# Patient Record
Sex: FEMALE | Race: WHITE
[De-identification: names, ages, dates, MRNs, and addresses within clinical notes are randomized per-mention and may not be internally consistent; named-entity substitution may affect disease eponyms.]

---

## 2019-09-13 ENCOUNTER — HOSPITAL ENCOUNTER (INPATIENT)
Dept: HOSPITAL 95 - ER | Age: 74
LOS: 3 days | Discharge: HOME HEALTH | DRG: 689 | End: 2019-09-16
Attending: HOSPITALIST | Admitting: HOSPITALIST
Payer: MEDICARE

## 2019-09-13 VITALS — HEIGHT: 67.99 IN | WEIGHT: 259.7 LBS | BODY MASS INDEX: 39.36 KG/M2

## 2019-09-13 DIAGNOSIS — F17.210: ICD-10-CM

## 2019-09-13 DIAGNOSIS — N39.0: Primary | ICD-10-CM

## 2019-09-13 DIAGNOSIS — I10: ICD-10-CM

## 2019-09-13 DIAGNOSIS — Z79.4: ICD-10-CM

## 2019-09-13 DIAGNOSIS — E78.5: ICD-10-CM

## 2019-09-13 DIAGNOSIS — E66.01: ICD-10-CM

## 2019-09-13 DIAGNOSIS — E11.65: ICD-10-CM

## 2019-09-13 DIAGNOSIS — G92: ICD-10-CM

## 2019-09-13 DIAGNOSIS — E87.1: ICD-10-CM

## 2019-09-13 DIAGNOSIS — E86.0: ICD-10-CM

## 2019-09-13 LAB
ALBUMIN SERPL BCP-MCNC: 2.9 G/DL (ref 3.4–5)
ALBUMIN/GLOB SERPL: 0.7 {RATIO} (ref 0.8–1.8)
ALT SERPL W P-5'-P-CCNC: 53 U/L (ref 12–78)
ANION GAP SERPL CALCULATED.4IONS-SCNC: 8 MMOL/L (ref 6–16)
AST SERPL W P-5'-P-CCNC: 131 U/L (ref 12–37)
BASOPHILS # BLD AUTO: 0.03 K/MM3 (ref 0–0.23)
BASOPHILS NFR BLD AUTO: 1 % (ref 0–2)
BILIRUB SERPL-MCNC: 0.5 MG/DL (ref 0.1–1)
BUN SERPL-MCNC: 31 MG/DL (ref 8–24)
CALCIUM SERPL-MCNC: 8.7 MG/DL (ref 8.5–10.1)
CHLORIDE SERPL-SCNC: 103 MMOL/L (ref 98–108)
CO2 SERPL-SCNC: 22 MMOL/L (ref 21–32)
CREAT SERPL-MCNC: 0.75 MG/DL (ref 0.4–1)
DEPRECATED RDW RBC AUTO: 42.8 FL (ref 35.1–46.3)
EOSINOPHIL # BLD AUTO: 0 K/MM3 (ref 0–0.68)
EOSINOPHIL NFR BLD AUTO: 0 % (ref 0–6)
ERYTHROCYTE [DISTWIDTH] IN BLOOD BY AUTOMATED COUNT: 13.1 % (ref 11.7–14.2)
GLOBULIN SER CALC-MCNC: 4 G/DL (ref 2.2–4)
GLUCOSE SERPL-MCNC: 412 MG/DL (ref 70–99)
GLUCOSE UR-MCNC: (no result) MG/DL
HCT VFR BLD AUTO: 46.9 % (ref 33–51)
HGB BLD-MCNC: 15.3 G/DL (ref 11.5–16)
IMM GRANULOCYTES # BLD AUTO: 0.02 K/MM3 (ref 0–0.1)
IMM GRANULOCYTES NFR BLD AUTO: 0 % (ref 0–1)
KETONES UR STRIP-MCNC: (no result) MG/DL
LEUKOCYTE ESTERASE UR QL STRIP: (no result)
LYMPHOCYTES # BLD AUTO: 0.46 K/MM3 (ref 0.84–5.2)
LYMPHOCYTES NFR BLD AUTO: 7 % (ref 21–46)
MCHC RBC AUTO-ENTMCNC: 32.6 G/DL (ref 31.5–36.5)
MCV RBC AUTO: 89 FL (ref 80–100)
MONOCYTES # BLD AUTO: 0.27 K/MM3 (ref 0.16–1.47)
MONOCYTES NFR BLD AUTO: 4 % (ref 4–13)
NEUTROPHILS # BLD AUTO: 5.52 K/MM3 (ref 1.96–9.15)
NEUTROPHILS NFR BLD AUTO: 88 % (ref 41–73)
NRBC # BLD AUTO: 0 K/MM3 (ref 0–0.02)
NRBC BLD AUTO-RTO: 0 /100 WBC (ref 0–0.2)
PLATELET # BLD AUTO: 150 K/MM3 (ref 150–400)
POTASSIUM SERPL-SCNC: 3.5 MMOL/L (ref 3.5–5.5)
PROT SERPL-MCNC: 6.9 G/DL (ref 6.4–8.2)
PROT UR STRIP-MCNC: (no result) MG/DL
RBC #/AREA URNS HPF: (no result) /HPF (ref 0–2)
SODIUM SERPL-SCNC: 133 MMOL/L (ref 136–145)
SP GR SPEC: 1.02 (ref 1–1.02)
UROBILINOGEN UR STRIP-MCNC: (no result) MG/DL
WBC #/AREA URNS HPF: (no result) /HPF (ref 0–5)

## 2019-09-13 PROCEDURE — P9612 CATHETERIZE FOR URINE SPEC: HCPCS

## 2019-09-13 PROCEDURE — A9270 NON-COVERED ITEM OR SERVICE: HCPCS

## 2019-09-13 NOTE — NUR
MD called.  Pt has severe yeast infection.  Recieved order for nystatin.  BP =
169/107 reported.  No further orders recieved.

## 2019-09-14 LAB
ANION GAP SERPL CALCULATED.4IONS-SCNC: 7 MMOL/L (ref 6–16)
BUN SERPL-MCNC: 24 MG/DL (ref 8–24)
CALCIUM SERPL-MCNC: 8.5 MG/DL (ref 8.5–10.1)
CHLORIDE SERPL-SCNC: 108 MMOL/L (ref 98–108)
CO2 SERPL-SCNC: 26 MMOL/L (ref 21–32)
CREAT SERPL-MCNC: 0.68 MG/DL (ref 0.4–1)
GLUCOSE SERPL-MCNC: 259 MG/DL (ref 70–99)
POTASSIUM SERPL-SCNC: 3.5 MMOL/L (ref 3.5–5.5)
SODIUM SERPL-SCNC: 141 MMOL/L (ref 136–145)

## 2019-09-14 NOTE — NUR
SHE IS OX3 BUT HAS BEEN SLEEPY A LOT TODAY. SHE IS EASILY AROUSABLE AND
COOPERATIVE. SHE HAS PSORIASIS AND SOME YEAST RASH TOO. NYSTATIN POWDER
APPLIED TO YEAST RASH IN ABD FOLD AND IN PERINEAL AREA. SHE WAS UNABLE FIRST
THING THIS MORNING TO STAND WITH PT. SHE HAS BEEN INCONTINENT ALL DAY IN
ATTENDS. HER 2L OF IVFS FINISHED. LABS WNL. HER DAD IS VISITING NOW. SHE HAS
HAD A HANDFUL OF VISITORS COME AND GO TODAY. NO FEVER. NO DELIRIUM.

## 2019-09-14 NOTE — NUR
Shift summary.  Pt mentation improving.  Pt able to converse with me and make
sense.  Not so lethargic.  Pt verk weak and unable to transfer.  Pt
incontinent of urine.  Changed x 2 during the night.  BP high at 169/107.  MD
notified and no further orders recieved.Blood sugar at arrival was 361.
Lantus insulin given.  MD notifed.  Blood sugar taken again at 0200- was 258.
No sliding scale recieved.

## 2019-09-15 LAB
ALBUMIN SERPL BCP-MCNC: 2.3 G/DL (ref 3.4–5)
ALBUMIN/GLOB SERPL: 0.7 {RATIO} (ref 0.8–1.8)
ALT SERPL W P-5'-P-CCNC: 43 U/L (ref 12–78)
ANION GAP SERPL CALCULATED.4IONS-SCNC: 6 MMOL/L (ref 6–16)
AST SERPL W P-5'-P-CCNC: 64 U/L (ref 12–37)
BASOPHILS # BLD AUTO: 0.04 K/MM3 (ref 0–0.23)
BASOPHILS NFR BLD AUTO: 1 % (ref 0–2)
BILIRUB SERPL-MCNC: 0.3 MG/DL (ref 0.1–1)
BUN SERPL-MCNC: 23 MG/DL (ref 8–24)
CALCIUM SERPL-MCNC: 8.4 MG/DL (ref 8.5–10.1)
CHLORIDE SERPL-SCNC: 110 MMOL/L (ref 98–108)
CO2 SERPL-SCNC: 26 MMOL/L (ref 21–32)
CREAT SERPL-MCNC: 0.63 MG/DL (ref 0.4–1)
DEPRECATED RDW RBC AUTO: 43.7 FL (ref 35.1–46.3)
EOSINOPHIL # BLD AUTO: 0.16 K/MM3 (ref 0–0.68)
EOSINOPHIL NFR BLD AUTO: 4 % (ref 0–6)
ERYTHROCYTE [DISTWIDTH] IN BLOOD BY AUTOMATED COUNT: 13 % (ref 11.7–14.2)
GLOBULIN SER CALC-MCNC: 3.5 G/DL (ref 2.2–4)
GLUCOSE SERPL-MCNC: 282 MG/DL (ref 70–99)
HCT VFR BLD AUTO: 41.9 % (ref 33–51)
HGB BLD-MCNC: 13.5 G/DL (ref 11.5–16)
IMM GRANULOCYTES # BLD AUTO: 0.01 K/MM3 (ref 0–0.1)
IMM GRANULOCYTES NFR BLD AUTO: 0 % (ref 0–1)
LYMPHOCYTES # BLD AUTO: 1 K/MM3 (ref 0.84–5.2)
LYMPHOCYTES NFR BLD AUTO: 24 % (ref 21–46)
MCHC RBC AUTO-ENTMCNC: 32.2 G/DL (ref 31.5–36.5)
MCV RBC AUTO: 91 FL (ref 80–100)
MONOCYTES # BLD AUTO: 0.36 K/MM3 (ref 0.16–1.47)
MONOCYTES NFR BLD AUTO: 9 % (ref 4–13)
NEUTROPHILS # BLD AUTO: 2.68 K/MM3 (ref 1.96–9.15)
NEUTROPHILS NFR BLD AUTO: 63 % (ref 41–73)
NRBC # BLD AUTO: 0 K/MM3 (ref 0–0.02)
NRBC BLD AUTO-RTO: 0 /100 WBC (ref 0–0.2)
PLATELET # BLD AUTO: 128 K/MM3 (ref 150–400)
POTASSIUM SERPL-SCNC: 3.7 MMOL/L (ref 3.5–5.5)
PROT SERPL-MCNC: 5.8 G/DL (ref 6.4–8.2)
SODIUM SERPL-SCNC: 142 MMOL/L (ref 136–145)

## 2019-09-15 NOTE — NUR
PATIENT HAD A GOOD DAY. A/OX4, WAS ABLE TO GET UP TO CHAIR WITH A FWW AND 1
ASSIST. ABLE TO AMBULATE TO THE BATHROOM THIS AFTERNOON WITH FWW, GAIT BELT
AND 1 ASSIST. NUMBNESS TO L LEG HAS RESOLVED AND PAIN HAS IMPROVED THIS SHIFT.
BLOOD SUGARS REMAIN ELEVATED, LANUTS INCREASED TO BID. ADA DIET, ACHS BLOOD
SUGARS. 20G IV TO R FA WNL AND SL. PATIENT NOW THINKS THAT SHE WOULD RATHER GO
HOME AT D/C INSTEAD OF SNF. PSORASIS RASH TO ABDOMEN AND HARDEEP AREA, NYSTATIN
POWDER USED TO TREAT. MULTIPLE LOOSE BM'S THIS SHIFT, WILL NOT NEED BOWEL
CARE THIS EVENING. B/P ELEVATED, STARTED ON HYDRALAZINE. CALM AND COOPERATIVE
WITH CARE, USES CALL LIGHT APPROPRIATELY FOR ASSISTANCE.

## 2019-09-15 NOTE — NUR
SHIFT SUMMARY
PT TRANSFERED FROM ROOM 349 AT APPROX 2330. VERY TIRED WHEN TRANSFERRING AND
REQUESTING TO BE LEFT TO SLEEP FOLLOWING. WOKE THIS AM WITH NO CONFUSION.
CLEAR MINDED AND ANSWERING QUESTIONS APPROPRIATELY. PT REPORTS THAT SHE STILL
FEELS VERY WEAK. PT INCONTINENT SINCE TRANSFER. ATTENDS IN PLACE. PSORIASIS
AROUND BELLY BUTTON, IN ABD FOLDS, AND IN HARDEEP AREA. HARDEEP AREA ALSO APPEARS TO
HAVE A YEASTY RASH. DIFFICULT TO TELL WITH PSORIASIS. NYSTATIN POWDER APPLIED.
SKIN SPLIT OPEN IN BUTT CRACK, BARRIER CREAM APPLIED. BLOOD PRESSURE REMAINS
ELEVATED. ATENOLOL RESTARTED THIS EVENING. SLOWLY IMPROVING. PT COMPLAINS OF
MINOR HEADACHE AND BACKACHE. MEDICATED W/ TYLENOL. OTHERWISE NO ACUTE CHANGES.
WILL CONTINUE TO MONITOR AND REPORT TO DAY RN.

## 2019-09-16 LAB
ALBUMIN SERPL BCP-MCNC: 2.6 G/DL (ref 3.4–5)
ALBUMIN/GLOB SERPL: 0.7 {RATIO} (ref 0.8–1.8)
ALT SERPL W P-5'-P-CCNC: 45 U/L (ref 12–78)
ANION GAP SERPL CALCULATED.4IONS-SCNC: 7 MMOL/L (ref 6–16)
AST SERPL W P-5'-P-CCNC: 51 U/L (ref 12–37)
BASOPHILS # BLD AUTO: 0.04 K/MM3 (ref 0–0.23)
BASOPHILS NFR BLD AUTO: 1 % (ref 0–2)
BILIRUB SERPL-MCNC: 0.4 MG/DL (ref 0.1–1)
BUN SERPL-MCNC: 21 MG/DL (ref 8–24)
CALCIUM SERPL-MCNC: 8.6 MG/DL (ref 8.5–10.1)
CHLORIDE SERPL-SCNC: 107 MMOL/L (ref 98–108)
CO2 SERPL-SCNC: 25 MMOL/L (ref 21–32)
CREAT SERPL-MCNC: 0.58 MG/DL (ref 0.4–1)
DEPRECATED RDW RBC AUTO: 41.7 FL (ref 35.1–46.3)
EOSINOPHIL # BLD AUTO: 0.24 K/MM3 (ref 0–0.68)
EOSINOPHIL NFR BLD AUTO: 5 % (ref 0–6)
ERYTHROCYTE [DISTWIDTH] IN BLOOD BY AUTOMATED COUNT: 12.8 % (ref 11.7–14.2)
GLOBULIN SER CALC-MCNC: 3.9 G/DL (ref 2.2–4)
GLUCOSE SERPL-MCNC: 240 MG/DL (ref 70–99)
HCT VFR BLD AUTO: 43 % (ref 33–51)
HGB BLD-MCNC: 13.9 G/DL (ref 11.5–16)
IMM GRANULOCYTES # BLD AUTO: 0.01 K/MM3 (ref 0–0.1)
IMM GRANULOCYTES NFR BLD AUTO: 0 % (ref 0–1)
LYMPHOCYTES # BLD AUTO: 1.52 K/MM3 (ref 0.84–5.2)
LYMPHOCYTES NFR BLD AUTO: 29 % (ref 21–46)
MCHC RBC AUTO-ENTMCNC: 32.3 G/DL (ref 31.5–36.5)
MCV RBC AUTO: 88 FL (ref 80–100)
MONOCYTES # BLD AUTO: 0.39 K/MM3 (ref 0.16–1.47)
MONOCYTES NFR BLD AUTO: 7 % (ref 4–13)
NEUTROPHILS # BLD AUTO: 3.07 K/MM3 (ref 1.96–9.15)
NEUTROPHILS NFR BLD AUTO: 58 % (ref 41–73)
NRBC # BLD AUTO: 0 K/MM3 (ref 0–0.02)
NRBC BLD AUTO-RTO: 0 /100 WBC (ref 0–0.2)
PLATELET # BLD AUTO: 159 K/MM3 (ref 150–400)
POTASSIUM SERPL-SCNC: 3.7 MMOL/L (ref 3.5–5.5)
PROT SERPL-MCNC: 6.5 G/DL (ref 6.4–8.2)
SODIUM SERPL-SCNC: 139 MMOL/L (ref 136–145)

## 2019-09-16 NOTE — NUR
SHIFT SUMMARY
PT DID WELL THIS EVENING. AMBULATED SBA W/ FWW TO RESTROOM MULTIPLE TIMES
THIS EVENING. PT DOES CONTINUE TO EXPRESS SOME CONCERN WITH BEING ALONE AT HER
HOUSE. PT DOES NOT FEEL THAT HER STRENGTH HAS COMPLETELY RETURNED. PT
CONTINENT THIS EVENING. MENTATION CLEAR. ALERT AND ORIENTED. PT REPORTS SOME
BODY DISCOMFORT FROM BEING IN BED. REPOSITIONED PT AS NEEDED FOR COMFORT.
PSORIASIS TO ABD AND HARDEEP AREA UNCHANGED. NYSTATIN POWDER TO GROIN AREA WHICH
IN ADDITION TO THE PSORIASIS APPEARS TO HAVE A YEAST RASH. BLOOD PRESSURE
ELEVATED THIS EVENING AND THIS AM. MEDICATED WITH PO SCHEDULED BLOOD PRESSURE
MEDS THIS EVENING AND BLOOD PRESSURE IMPROVED. ELEVATED AGAIN THIS AM, IV
HYDRALAZINE GIVEN. PT REFUSED TO LET BLOOD PRESSURE BE RECHECKED FOLLOWING.
OTHERWISE VITAL SIGNS ARE STABLE. NO OTHER ACUTE CHANGES THIS SHIFT. WILL
CONTINUE TO MONITOR.

## 2019-09-16 NOTE — NUR
1825 PT DISCHARGED HOME VIA PERSONAL VEHICLE ACCOMPANIED AND DRIVEN BY
DAUGHTER. ESCORTED TO ENTRANCE VIA W/C BY CNA. IV REMOVED BY PT ACCIDENTLY
EARLIER IN SHIFT. D/C PAPERWORK REVIEWED WITH PT AND COPY PROVIDED. NEW RX
FAXED TO [facility] PER PT REQUEST. SBP ELEVATED 196 AT 1700, DR. KWONG
NOTIFIED, RECIEVED ORDERS FOR CLONIDINE 0.2MG PO NOW, AND PT MAY D/C IF BP
IMPROVED. 1805 . PT TO FOLLOW UP WITH PCP ON 9/19 AT 1345, THIS RN MADE
APPOINTMENT. PT INSTRUCTED TO TAKE BP BID AND RECORD DATA FOR FOLLOW UP
APPOINTMENT. CBG ELEVATED PRIOR TO D/C, PT REPORTED THAT SHE WAS NOT GOING TO
EAT DINNER TILL D/C'D AND WOULD TAKE HOME INSULIN.

## 2020-12-07 ENCOUNTER — HOSPITAL ENCOUNTER (INPATIENT)
Dept: HOSPITAL 95 - ER | Age: 75
LOS: 3 days | Discharge: HOME | DRG: 291 | End: 2020-12-10
Attending: INTERNAL MEDICINE | Admitting: INTERNAL MEDICINE
Payer: MEDICARE

## 2020-12-07 VITALS — BODY MASS INDEX: 37.92 KG/M2 | WEIGHT: 250.22 LBS | HEIGHT: 67.99 IN

## 2020-12-07 DIAGNOSIS — F17.210: ICD-10-CM

## 2020-12-07 DIAGNOSIS — E66.01: ICD-10-CM

## 2020-12-07 DIAGNOSIS — I11.0: Primary | ICD-10-CM

## 2020-12-07 DIAGNOSIS — I16.0: ICD-10-CM

## 2020-12-07 DIAGNOSIS — Z66: ICD-10-CM

## 2020-12-07 DIAGNOSIS — I44.7: ICD-10-CM

## 2020-12-07 DIAGNOSIS — I50.31: ICD-10-CM

## 2020-12-07 DIAGNOSIS — E11.9: ICD-10-CM

## 2020-12-07 DIAGNOSIS — N17.9: ICD-10-CM

## 2020-12-07 DIAGNOSIS — E78.5: ICD-10-CM

## 2020-12-07 DIAGNOSIS — Z79.4: ICD-10-CM

## 2020-12-07 LAB
ALBUMIN SERPL BCP-MCNC: 3.1 G/DL (ref 3.4–5)
ALBUMIN/GLOB SERPL: 0.8 {RATIO} (ref 0.8–1.8)
ALT SERPL W P-5'-P-CCNC: 12 U/L (ref 12–78)
ANION GAP SERPL CALCULATED.4IONS-SCNC: 6 MMOL/L (ref 6–16)
AST SERPL W P-5'-P-CCNC: 15 U/L (ref 12–37)
BASOPHILS # BLD AUTO: 0.09 K/MM3 (ref 0–0.23)
BASOPHILS NFR BLD AUTO: 1 % (ref 0–2)
BILIRUB SERPL-MCNC: 0.6 MG/DL (ref 0.1–1)
BUN SERPL-MCNC: 23 MG/DL (ref 8–24)
CALCIUM SERPL-MCNC: 8.9 MG/DL (ref 8.5–10.1)
CHLORIDE SERPL-SCNC: 112 MMOL/L (ref 98–108)
CO2 SERPL-SCNC: 26 MMOL/L (ref 21–32)
CREAT SERPL-MCNC: 0.88 MG/DL (ref 0.4–1)
DEPRECATED RDW RBC AUTO: 45.9 FL (ref 35.1–46.3)
EOSINOPHIL # BLD AUTO: 0.32 K/MM3 (ref 0–0.68)
EOSINOPHIL NFR BLD AUTO: 4 % (ref 0–6)
ERYTHROCYTE [DISTWIDTH] IN BLOOD BY AUTOMATED COUNT: 14.1 % (ref 11.7–14.2)
GLOBULIN SER CALC-MCNC: 3.7 G/DL (ref 2.2–4)
GLUCOSE SERPL-MCNC: 161 MG/DL (ref 70–99)
HCT VFR BLD AUTO: 41.2 % (ref 33–51)
HGB BLD-MCNC: 12.9 G/DL (ref 11.5–16)
IMM GRANULOCYTES # BLD AUTO: 0.01 K/MM3 (ref 0–0.1)
IMM GRANULOCYTES NFR BLD AUTO: 0 % (ref 0–1)
LEUKOCYTE ESTERASE UR QL STRIP: (no result)
LYMPHOCYTES # BLD AUTO: 1.18 K/MM3 (ref 0.84–5.2)
LYMPHOCYTES NFR BLD AUTO: 16 % (ref 21–46)
MCHC RBC AUTO-ENTMCNC: 31.3 G/DL (ref 31.5–36.5)
MCV RBC AUTO: 90 FL (ref 80–100)
MONOCYTES # BLD AUTO: 0.33 K/MM3 (ref 0.16–1.47)
MONOCYTES NFR BLD AUTO: 4 % (ref 4–13)
NEUTROPHILS # BLD AUTO: 5.54 K/MM3 (ref 1.96–9.15)
NEUTROPHILS NFR BLD AUTO: 74 % (ref 41–73)
NRBC # BLD AUTO: 0 K/MM3 (ref 0–0.02)
NRBC BLD AUTO-RTO: 0 /100 WBC (ref 0–0.2)
PLATELET # BLD AUTO: 201 K/MM3 (ref 150–400)
POTASSIUM SERPL-SCNC: 3.5 MMOL/L (ref 3.5–5.5)
PROT SERPL-MCNC: 6.8 G/DL (ref 6.4–8.2)
PROT UR STRIP-MCNC: (no result) MG/DL
RBC #/AREA URNS HPF: (no result) /HPF (ref 0–2)
SODIUM SERPL-SCNC: 144 MMOL/L (ref 136–145)
SP GR SPEC: 1.01 (ref 1–1.02)
TROPONIN I SERPL-MCNC: 0.03 NG/ML (ref 0–0.04)
UROBILINOGEN UR STRIP-MCNC: (no result) MG/DL
WBC #/AREA URNS HPF: (no result) /HPF (ref 0–5)

## 2020-12-07 PROCEDURE — A9270 NON-COVERED ITEM OR SERVICE: HCPCS

## 2020-12-07 NOTE — NUR
SUMMARY
 
No acute changes since arrival to unit. Nitro drip remained at 45 mcg/min for
majority of afternoon, but was titrated down to 40 mcg/min when most recent IV
lasix was given. Pt resting in bed, has not gotten OOB since arrival to unit.
Pt pleasant and cooperative with care. Utilizes call light appropriately.
Bedside report given to oncoming RNKaren.

## 2020-12-07 NOTE — NUR
Call placed to Dr Mccann. Notified provider that pt has been having soft
bowel movements, and has had three bowel movements since 1300. Inquired about
obtaining COVID 19 swab since patient's concern on presentation to hospital is
shortness of breath and pt is having soft, frequent bowel movements. Provider
stated she did not want to obtain COVID 19 swab and states low suspicion for
COVID 19. Inquired if GI panel should be sent, provider stated no, and would
like nursing staff to continue to monitor bowel movements. This RN also asked
when to reintroduce patient's home medications for BP and she referenced H&P
which states tonight or tomorrow and stated it will be up to the night
hospitalist or day hospitalist tomorrow to reorder these medications.

## 2020-12-07 NOTE — NUR
Assumed care of pt upon arrival to ICU 16 from emergency department at 1305.
Pt arrived accompanied by Tia MARTINES. Pt transferred from ED [facility] to ICU bed
using slider sheet and 4 staff. Pt A&O x 4. Answers questions. Follows
commands. Verbalizes needs. Pt on room air. SpO2 90% or greater. Lungs dim
t/o. Pt states she feels her shortness of breath has greatly improved. SR per
monitor. Hypertensive. Pt arrived with nitro drip at 50 mcg/min on arrival.
Currently 45 mcg/min to maintain goal  mmHg. Pt states she has
headache, educated on nitroglycerin side effects. Home meds reviewed. Pt
states she took all of her meds as prescribed up until this morning.  ABD
soft, nontender, nondistended. Pt having soft bowel movements in bed pan.
States that soft and frequent bowel movements are typical for her and she
often has bowel incontinence due to urgency. Moran catheter in place for
strict measurement of I&O. Clear, barely yellow urine in collection bag.
Patient has redness to her gluteal fold, which she states is common and she
scratches it because it itches, which contributes to the redness. Bed in
lowest position. Call light in reach. Pt denies need at this time.

## 2020-12-07 NOTE — NUR
Call placed to Dr Mccann. Pt has headache. Educated on nitroglycerin side
effects. Pt given tylenol and coffee. Tylenol did not alleviate pt's
headache. Pt requesting Aleve. Dr Mccann stated patient may not have aleve as
this may increase her blood pressure. Orders given for Tramadol.

## 2020-12-07 NOTE — NUR
ASSESSMENT/ASSUMED CARE
PT SITTING UP IN BED WITH EYES CLOSED. AWAKENS TO VERBAL STIMULI. DENIES PAIN
AT THIS TIME. A&O X4. LUNGS CLEAR BUT DECREASED THROUGHOUT. DENIES SOB OR
COUGH. RESP EVEN AND NONLABORED. PT STATES,"I FEEL MUCH BETTER THAN WHEN I
CAME IN TODAY". HEART RATE REGULAR IN THE 60'S. BP ELEVATED. CALL OUT TO DR KWONG REGARDING HTN. PT ON NTG GTT AT 40 MCQ. LOWER EXT EDEMA NOTED. BT+
ABD SOFT AND NONTENDER. IV 22G TO RIGHT HAND WITH NTG AT 40 MCQ, SITE CLEAR.
IV 20G TO LEFT HAND SALINE LOCKED, SITE CLEAR. JIMENEZ CATH PATENT DRAINING
CLEAR YELLOW URINE. DNR BAND PLACED ON RIGHT WRIST.

## 2020-12-07 NOTE — NUR
O2
SPO2 DOWN TO 86% THAN RIGHT BACK UP TO 97%. RESP EVEN AND NONLABORED. PT
PLACED ON 2 LITER O2 VIA NC. NTG INCREASED TO 45 MCQ DUE TO SBP IN 'S.

## 2020-12-08 LAB
ANION GAP SERPL CALCULATED.4IONS-SCNC: 6 MMOL/L (ref 6–16)
BUN SERPL-MCNC: 28 MG/DL (ref 8–24)
CALCIUM SERPL-MCNC: 8.6 MG/DL (ref 8.5–10.1)
CHLORIDE SERPL-SCNC: 111 MMOL/L (ref 98–108)
CO2 SERPL-SCNC: 29 MMOL/L (ref 21–32)
CREAT SERPL-MCNC: 1.08 MG/DL (ref 0.4–1)
GLUCOSE SERPL-MCNC: 191 MG/DL (ref 70–99)
MAGNESIUM SERPL-MCNC: 1.7 MG/DL (ref 1.6–2.4)
POTASSIUM SERPL-SCNC: 3.4 MMOL/L (ref 3.5–5.5)
SODIUM SERPL-SCNC: 146 MMOL/L (ref 136–145)

## 2020-12-08 NOTE — NUR
ASSUMED CARE OF PT, BEDSIDE REPORT RECEIVED. PT IS UP TO BSC DURING SHIFT
REPORT FOR VOID, 200 ML CLEAR YELLOW URINE WITHOUT DIFFICULTY. PT DENIES
DIZZINESS/VERTIGO WITH STANDING, SHE IS NOTED SPEAKING IN FULL SENTENCES BUT
DOES ADMIT TO SOME CONTINUING DYSPNEA ON EXERTION. LUNGS ARE CLEAR WITH DIM
BASES BILAT. DENIES OTHER NEEDS AT THIS TIME.

## 2020-12-08 NOTE — NUR
SHIFT SUMMARY:
 
PT HAS BEEN OFF OF NITRO GTT SINCE 0130. DR AWARE OF HYPERTENSION AT TIMES
WITH PRN AND SCHEDULED MEDS AVAILABLE. PT IS HEAVY 1 ASSIST TO BSC. DR'S NOT
STATES PLAN FOR PT/OT. DENIES CHEST PAIN BUT STATES SOB WITH ACTIVITY. NO
FURTHER NEEDS OR CONCERNS AT THIS TIME.

## 2020-12-08 NOTE — NUR
ASSUMED CARE:
 
PT IS ALERT AND ORIENTED, COOPERATIVE. ON 2L O2 VIA NC WITH SLEEP. NITRO GTT
HAS BEEN OFF SINCE 0130 THIS AM. PT DENIES NEEDS OR CONCERNS AT THIS TIME. NSR
ON TELE.

## 2020-12-08 NOTE — NUR
SHIFT SUMMARY
PT SLEEPING AT THIS TIME, AWAKENS EASILY TO VERBAL STIMULI. PT USING CALL
LIGHT. TURNING AND MOVING SELF IN BED WITH MIN ASSIST. PT PLACED ON 2 LITERS
O2 DURING THE NIGHT DUE TO SPO2 GOING DOWN TO 86% THAN RIGHT BACK UP TO 97%
WHEN SLEEPING. RESP EVEN AND NONLABORED. HEART RATE REGULAR. BP STABLE AT THIS
TIME. NTG GTT TITRATED OFF DURING THE NIGHT, AFTER ORAL MEDS GIVEN. IV'S
SALINE LOCKED. JIMENEZ CATH PATENT, DRAINING CLEAR YELLOW URINE. REPORT TO ON
COMING NURSE.

## 2020-12-08 NOTE — NUR
DR NAJERA CAME BY TO SEE PT. AWARE THAT PT HAS BEEN OFF OF NITRO GTT SINCE
130 THIS AM BUT BEGAN BEING HYPERTENSIVE THIS AFTERNOON.  STATES HE IS OK
WITH MEDICAL STATUS AND PUT IN ORDERS FOR PRN BED FOR HYPERTENSION. FEELS PT'S
HYPERTENSION IS CHRONIC DUE TO CHF EXACERBATION.

## 2020-12-09 LAB
ANION GAP SERPL CALCULATED.4IONS-SCNC: 5 MMOL/L (ref 6–16)
BASOPHILS # BLD AUTO: 0.06 K/MM3 (ref 0–0.23)
BASOPHILS NFR BLD AUTO: 1 % (ref 0–2)
BUN SERPL-MCNC: 21 MG/DL (ref 8–24)
CALCIUM SERPL-MCNC: 9.1 MG/DL (ref 8.5–10.1)
CHLORIDE SERPL-SCNC: 108 MMOL/L (ref 98–108)
CO2 SERPL-SCNC: 30 MMOL/L (ref 21–32)
CREAT SERPL-MCNC: 0.84 MG/DL (ref 0.4–1)
DEPRECATED RDW RBC AUTO: 45.3 FL (ref 35.1–46.3)
EOSINOPHIL # BLD AUTO: 0.22 K/MM3 (ref 0–0.68)
EOSINOPHIL NFR BLD AUTO: 3 % (ref 0–6)
ERYTHROCYTE [DISTWIDTH] IN BLOOD BY AUTOMATED COUNT: 13.9 % (ref 11.7–14.2)
GLUCOSE SERPL-MCNC: 204 MG/DL (ref 70–99)
HCT VFR BLD AUTO: 39.5 % (ref 33–51)
HGB BLD-MCNC: 12.1 G/DL (ref 11.5–16)
IMM GRANULOCYTES # BLD AUTO: 0.02 K/MM3 (ref 0–0.1)
IMM GRANULOCYTES NFR BLD AUTO: 0 % (ref 0–1)
LYMPHOCYTES # BLD AUTO: 1.3 K/MM3 (ref 0.84–5.2)
LYMPHOCYTES NFR BLD AUTO: 19 % (ref 21–46)
MCHC RBC AUTO-ENTMCNC: 30.6 G/DL (ref 31.5–36.5)
MCV RBC AUTO: 89 FL (ref 80–100)
MONOCYTES # BLD AUTO: 0.44 K/MM3 (ref 0.16–1.47)
MONOCYTES NFR BLD AUTO: 7 % (ref 4–13)
NEUTROPHILS # BLD AUTO: 4.71 K/MM3 (ref 1.96–9.15)
NEUTROPHILS NFR BLD AUTO: 70 % (ref 41–73)
NRBC # BLD AUTO: 0 K/MM3 (ref 0–0.02)
NRBC BLD AUTO-RTO: 0 /100 WBC (ref 0–0.2)
PLATELET # BLD AUTO: 189 K/MM3 (ref 150–400)
POTASSIUM SERPL-SCNC: 3.1 MMOL/L (ref 3.5–5.5)
SODIUM SERPL-SCNC: 143 MMOL/L (ref 136–145)

## 2020-12-09 NOTE — NUR
SHIFT SUMMARY
 
PT TRANSFERRED TO UNIT FROM ICU AT APPROXIMATELY 1530. PT IS AOX4 AND
PLEASANT. PT DENIES N/V, SOB, AND PAIN. PT'S GRANDSON VISITED THIS ELY. PT IS
INDEPENDENT IN ROOM. NO ACUTE CHANGES THIS ELY. PT IS IN BED, CALL LIGHT IN
REACH, BED IN LOW POSITION.

## 2020-12-09 NOTE — NUR
BLOOD PRESSURE
ASSESSED PT'S BLOOD PRESSURE FOR POSS CATAPRES PRN DOSE, SHE IS NOTED TO
MAINTAIN TENSION IN HER ARM WITH AUTO-CUFF INFLATION AND C/O PAIN. MANUAL BP
CUFF IS REPORTED TO HAVE IMPROVED PT COMFORT. AUTO-CUFF READING 201/89, MANUAL
/90. WILL REASSESS AT 0400.

## 2020-12-09 NOTE — NUR
BLOOD SUGAR ; COVERAGE GIVEN.  PATIENT WATCHING TV.  NO COMPLAINTS.
CALL LIGHT IN REACH.  NO ACUTE CHANGES TO NOTE ON.

## 2020-12-09 NOTE — NUR
INITIAL ASSESSMENT
  PATIENT ALERT AND ORIENTED X 4, AFEBRILE.  PATIENT INDEPENDENT IN ROOM.
PATIENT SATTING 90% AND GREATER ON RA.  LUNGS CLEAR, DIM IN LOWER LOBES.
HR IN THE 60S.  SBP IN THE 190S.  GI WNL.   WNL.  SKIN PALE.  GLUTEAL FOLD
REDDENED.  SKIN APPEARS CLEAN, DRY, INTACT.  PATIENT IN CHAIR.  CALL LIGHT IN
REACH.  WILL CONTINUE TO MONITOR PATIENT FREQUENTLY THOUGHOUT SHIFT.

## 2020-12-09 NOTE — NUR
DR. NAJERA UPDATED ON PATIENT STATUS.  DOCTOR INFORMED THAT PATIENT'S SBP
REMAINS IN 190S DESPITE AM BP MEDS BEING GIVEN, AS WELL AS PRN CATAPRES.
DOCTOR STATED TO ORDER RENAL ULTRASOUND AND TO RECHECK BP AGAIN AROUND 1700.

## 2020-12-09 NOTE — NUR
PT RESTS QUIETLY THROUGHOUT THIS SHIFT, DENIES NEEDS WITH HOURLY ROUNDS, PRN
CATAPRES ADMIN X 1 FOR SBP OF 190S. PT DID DECIDE TO SLEEP IN RECLINER CHAIR
IN ROOM RELATED TO DISCOMFORT WITH BED. HAS STATED THAT SHE IS COMFORTABLE
AND DENIED NEEDS. POTASSIUM IS NOTED 3.1 THIS AM, DR LEWIS NOTIFIED, CURRENT IV
ACCESS DISCUSSED AS WELL AS PT ABILITY TO TAKE PO MEDS. POTASSIUM 40 MEQ PO X
1 THIS AM IS ORDERED. OTHERWISE NO ACUTE CHANGES.

## 2020-12-09 NOTE — NUR
SHIFT SUMMARY
  PATIENT HAS REMAINED ALERT AND ORIENTED X 4, AFEBRILE.  NO COMPLAINTS OF
PAIN.  PATIENT HAS REMAINED INDEPENDENT IN ROOM.  HR IN THE 60S.  SBP IN THE
190S.  PRN CATAPRESS GIVEN OT THIS SHIFT.  RENAL ULTRASOUND PERFORMED.  GI AND
 WNL.  PATIENT HAD 2 MEDIUM, SOFT STOOLS.  GOOD APPETITE.  BLOOD SUGARS
NEEDED COVERAGE OT.  NO CHANGE TO SKIN.  REPORT HAS BEEN GIVEN TO ASSUMING
MEDICAL FLOOR NURSE.  PATIENT TO GO TO ROOM 336.

## 2020-12-10 LAB
ANION GAP SERPL CALCULATED.4IONS-SCNC: 6 MMOL/L (ref 6–16)
BASOPHILS # BLD AUTO: 0.09 K/MM3 (ref 0–0.23)
BASOPHILS NFR BLD AUTO: 1 % (ref 0–2)
BUN SERPL-MCNC: 25 MG/DL (ref 8–24)
CALCIUM SERPL-MCNC: 9.4 MG/DL (ref 8.5–10.1)
CHLORIDE SERPL-SCNC: 109 MMOL/L (ref 98–108)
CO2 SERPL-SCNC: 27 MMOL/L (ref 21–32)
CREAT SERPL-MCNC: 0.83 MG/DL (ref 0.4–1)
DEPRECATED RDW RBC AUTO: 46.8 FL (ref 35.1–46.3)
EOSINOPHIL # BLD AUTO: 0.28 K/MM3 (ref 0–0.68)
EOSINOPHIL NFR BLD AUTO: 4 % (ref 0–6)
ERYTHROCYTE [DISTWIDTH] IN BLOOD BY AUTOMATED COUNT: 13.9 % (ref 11.7–14.2)
GLUCOSE SERPL-MCNC: 214 MG/DL (ref 70–99)
HCT VFR BLD AUTO: 43.2 % (ref 33–51)
HGB BLD-MCNC: 13.1 G/DL (ref 11.5–16)
IMM GRANULOCYTES # BLD AUTO: 0.01 K/MM3 (ref 0–0.1)
IMM GRANULOCYTES NFR BLD AUTO: 0 % (ref 0–1)
LYMPHOCYTES # BLD AUTO: 1.3 K/MM3 (ref 0.84–5.2)
LYMPHOCYTES NFR BLD AUTO: 19 % (ref 21–46)
MCHC RBC AUTO-ENTMCNC: 30.3 G/DL (ref 31.5–36.5)
MCV RBC AUTO: 91 FL (ref 80–100)
MONOCYTES # BLD AUTO: 0.42 K/MM3 (ref 0.16–1.47)
MONOCYTES NFR BLD AUTO: 6 % (ref 4–13)
NEUTROPHILS # BLD AUTO: 4.67 K/MM3 (ref 1.96–9.15)
NEUTROPHILS NFR BLD AUTO: 69 % (ref 41–73)
NRBC # BLD AUTO: 0 K/MM3 (ref 0–0.02)
NRBC BLD AUTO-RTO: 0 /100 WBC (ref 0–0.2)
PLATELET # BLD AUTO: 184 K/MM3 (ref 150–400)
POTASSIUM SERPL-SCNC: 3.6 MMOL/L (ref 3.5–5.5)
SODIUM SERPL-SCNC: 142 MMOL/L (ref 136–145)

## 2020-12-10 NOTE — NUR
PT DISCHARGED FROM THE UNIT. IV REMOVED. DISCHARGE INSTRUCTIONS REVIEWED.
MEDICATIONS FAXED. PT LEFT AT 1315 VIA WHEEL CHAIR. INSTRUCTED PT ON LOW SS
INSULIN COVERAGE.

## 2020-12-10 NOTE — NUR
SHIFT SUMMARY:
74 Y/O OBESE FEMALE RESTED COMFORTABLY ALL SHIFT; HAPPY AND COOPERTIVE; VITAL
SIGNS STABLE; DENIES PAIN, NAUSEA OR DYSPNEA; UP PER SELF TO BATHROOM AND BACK
WITHOUT ISSUE; BED LOW POSITION WITH CALL LIGHT AT SIDE.

## 2021-07-28 ENCOUNTER — HOSPITAL ENCOUNTER (OUTPATIENT)
Dept: HOSPITAL 95 - LAB SHORT | Age: 76
End: 2021-07-28
Attending: NURSE PRACTITIONER
Payer: MEDICARE

## 2021-07-28 DIAGNOSIS — E78.5: ICD-10-CM

## 2021-07-28 DIAGNOSIS — I10: Primary | ICD-10-CM

## 2021-07-28 LAB
ALBUMIN SERPL BCP-MCNC: 3.1 G/DL (ref 3.4–5)
ALBUMIN/GLOB SERPL: 0.8 {RATIO} (ref 0.8–1.8)
ALT SERPL W P-5'-P-CCNC: 27 U/L (ref 12–78)
ANION GAP SERPL CALCULATED.4IONS-SCNC: 6 MMOL/L (ref 6–16)
AST SERPL W P-5'-P-CCNC: 27 U/L (ref 12–37)
BILIRUB SERPL-MCNC: 0.5 MG/DL (ref 0.1–1)
BUN SERPL-MCNC: 26 MG/DL (ref 8–24)
CALCIUM SERPL-MCNC: 9 MG/DL (ref 8.5–10.1)
CHLORIDE SERPL-SCNC: 112 MMOL/L (ref 98–108)
CHOLEST SERPL-MCNC: 172 MG/DL (ref 50–200)
CHOLEST/HDLC SERPL: 3.8 {RATIO}
CO2 SERPL-SCNC: 23 MMOL/L (ref 21–32)
CREAT SERPL-MCNC: 0.86 MG/DL (ref 0.4–1)
GLOBULIN SER CALC-MCNC: 4 G/DL (ref 2.2–4)
GLUCOSE SERPL-MCNC: 129 MG/DL (ref 70–99)
HDLC SERPL-MCNC: 45 MG/DL (ref 39–?)
LDLC SERPL CALC-MCNC: 102 MG/DL (ref 0–110)
LDLC/HDLC SERPL: 2.3 {RATIO}
POTASSIUM SERPL-SCNC: 4.1 MMOL/L (ref 3.5–5.5)
PROT SERPL-MCNC: 7.1 G/DL (ref 6.4–8.2)
SODIUM SERPL-SCNC: 141 MMOL/L (ref 136–145)
TRIGL SERPL-MCNC: 127 MG/DL (ref 30–160)
VLDLC SERPL CALC-MCNC: 25 MG/DL (ref 6–32)